# Patient Record
Sex: FEMALE | Race: WHITE | NOT HISPANIC OR LATINO | Employment: STUDENT | URBAN - METROPOLITAN AREA
[De-identification: names, ages, dates, MRNs, and addresses within clinical notes are randomized per-mention and may not be internally consistent; named-entity substitution may affect disease eponyms.]

---

## 2023-03-30 DIAGNOSIS — F32.A DEPRESSION, UNSPECIFIED DEPRESSION TYPE: Primary | ICD-10-CM

## 2023-03-30 RX ORDER — SERTRALINE HYDROCHLORIDE 50 MG/1
75 TABLET, FILM COATED ORAL DAILY
Qty: 45 TABLET | Refills: 0 | Status: SHIPPED | OUTPATIENT
Start: 2023-03-30 | End: 2023-04-27 | Stop reason: SDUPTHER

## 2023-04-27 DIAGNOSIS — F32.A DEPRESSION, UNSPECIFIED DEPRESSION TYPE: ICD-10-CM

## 2023-04-27 RX ORDER — SERTRALINE HYDROCHLORIDE 50 MG/1
75 TABLET, FILM COATED ORAL DAILY
Qty: 45 TABLET | Refills: 0 | Status: SHIPPED | OUTPATIENT
Start: 2023-04-27 | End: 2023-05-11 | Stop reason: SDUPTHER

## 2023-05-09 PROBLEM — I73.00 RAYNAUD PHENOMENON: Status: RESOLVED | Noted: 2023-05-09 | Resolved: 2023-05-09

## 2023-05-09 PROBLEM — F41.1 GENERALIZED ANXIETY DISORDER: Status: ACTIVE | Noted: 2023-05-09

## 2023-05-09 PROBLEM — J06.9 ACUTE URI: Status: RESOLVED | Noted: 2023-05-09 | Resolved: 2023-05-09

## 2023-05-09 PROBLEM — R10.9 ABDOMINAL PAIN: Status: RESOLVED | Noted: 2023-05-09 | Resolved: 2023-05-09

## 2023-05-09 PROBLEM — H66.91 RIGHT OTITIS MEDIA: Status: RESOLVED | Noted: 2023-05-09 | Resolved: 2023-05-09

## 2023-05-09 PROBLEM — N91.2 AMENORRHEA: Status: RESOLVED | Noted: 2023-05-09 | Resolved: 2023-05-09

## 2023-05-09 RX ORDER — DROSPIRENONE AND ETHINYL ESTRADIOL 0.02-3(28)
1 KIT ORAL DAILY
COMMUNITY

## 2023-05-09 RX ORDER — NEOMYCIN/POLYMYXIN B/PRAMOXINE 3.5-10K-1
CREAM (GRAM) TOPICAL
COMMUNITY

## 2023-05-11 ENCOUNTER — OFFICE VISIT (OUTPATIENT)
Dept: PEDIATRICS | Facility: CLINIC | Age: 23
End: 2023-05-11
Payer: COMMERCIAL

## 2023-05-11 VITALS — WEIGHT: 130.2 LBS

## 2023-05-11 DIAGNOSIS — F32.A DEPRESSION, UNSPECIFIED DEPRESSION TYPE: ICD-10-CM

## 2023-05-11 DIAGNOSIS — F41.1 GENERALIZED ANXIETY DISORDER: Primary | ICD-10-CM

## 2023-05-11 PROBLEM — K58.0 IRRITABLE BOWEL SYNDROME WITH DIARRHEA: Status: ACTIVE | Noted: 2020-12-03

## 2023-05-11 PROBLEM — E73.9 LACTOSE INTOLERANCE: Status: ACTIVE | Noted: 2020-12-03

## 2023-05-11 PROCEDURE — 99213 OFFICE O/P EST LOW 20 MIN: CPT | Performed by: PEDIATRICS

## 2023-05-11 RX ORDER — SERTRALINE HYDROCHLORIDE 50 MG/1
75 TABLET, FILM COATED ORAL DAILY
Qty: 45 TABLET | Refills: 3 | Status: SHIPPED | OUTPATIENT
Start: 2023-05-11 | End: 2023-06-01 | Stop reason: SDUPTHER

## 2023-05-11 RX ORDER — SERTRALINE HYDROCHLORIDE 50 MG/1
75 TABLET, FILM COATED ORAL DAILY
Qty: 45 TABLET | Refills: 3 | Status: SHIPPED | OUTPATIENT
Start: 2023-05-11 | End: 2023-05-11 | Stop reason: SDUPTHER

## 2023-05-11 NOTE — PROGRESS NOTES
Patient ID: Lydia Pierce is a 22 y.o. female who presents with Self for Illness (Med Check).        \[facility]\""    Comes in today for medication check.  She currently takes Zoloft 75 mg for generalized anxiety.  Overall she is doing exceptionally well.  She has been out of school for a couple of weeks for the summer.  Rates her daily anxiety levels on a scale of 1-10 at about a 2.    Review of Systems    EYES: No injection no drainage  ENT: Normal  GI: No N/V/D  RESP: No cough, congestion, no SOB  CV: No chest pain, palpitations  Neuro: As noted in HPI  SKIN: No rash or lesions    Objective   Wt 59.1 kg (130 lb 3.2 oz)   BSA: There is no height or weight on file to calculate BSA.  Growth percentiles: Facility age limit for growth %sherri is 20 years. Facility age limit for growth %sherri is 20 years.       Physical Exam    Const: No fever  Eye: Pupils are equal and reactive.  Ears:  Right TM is clear.  Left TM is clear.  Nose: Clear nares, no edema.  Mouth: Moist membranes, no erythema  Neck: No adenopathy, normal thyroid.  Heart: Regular rate and rhythm.  Lungs: Clear breath sounds bilaterally.  Abdomen: Soft, Non-tender, Non-distended, Normal bowel sounds.    ASSESSMENT and PLAN:    Diagnoses and all orders for this visit:  Generalized anxiety disorder  Depression, unspecified depression type  -     sertraline (Zoloft) 50 mg tablet; Take 1.5 tablets (75 mg) by mouth once daily. 1 1/2 tablets Daily    Overall she is doing well.  I did bring up the possibility of weaning her off the medication since she is doing so well and has been on it for approximately 4 years.  She would like to discuss this with her mother.  She will let me know next week.

## 2023-06-01 ENCOUNTER — TELEPHONE (OUTPATIENT)
Dept: PEDIATRICS | Facility: CLINIC | Age: 23
End: 2023-06-01
Payer: COMMERCIAL

## 2023-06-01 DIAGNOSIS — F41.1 GENERALIZED ANXIETY DISORDER: Primary | ICD-10-CM

## 2023-06-01 DIAGNOSIS — F32.A DEPRESSION, UNSPECIFIED DEPRESSION TYPE: ICD-10-CM

## 2023-06-01 RX ORDER — SERTRALINE HYDROCHLORIDE 50 MG/1
75 TABLET, FILM COATED ORAL DAILY
Qty: 45 TABLET | Refills: 3 | Status: SHIPPED | OUTPATIENT
Start: 2023-06-01 | End: 2023-06-26

## 2023-06-01 NOTE — TELEPHONE ENCOUNTER
Spoke to mom.  We will gradually taper over the next month.  Guidelines given to mom.  Call with any questions or concerns.

## 2023-06-01 NOTE — TELEPHONE ENCOUNTER
Lydia's mom called in for a refill on the prescription for sertraline. Lydia also decided to start to wean off of the sertraline and would like the instructions to do so. First listed [facility] listed in pt's chart is confirmed.